# Patient Record
Sex: FEMALE | Race: WHITE | NOT HISPANIC OR LATINO | Employment: OTHER | URBAN - METROPOLITAN AREA
[De-identification: names, ages, dates, MRNs, and addresses within clinical notes are randomized per-mention and may not be internally consistent; named-entity substitution may affect disease eponyms.]

---

## 2023-06-02 NOTE — PROGRESS NOTES
Well Woman Visit    CC: Scheduled annual well gyn visit  Chief Complaint   Patient presents with    WWE     Nexplanon placed about a year ago at [facility] in [place]        Prior Myriad or Hereditary Cancer testing (attach copy of result if yes):  N/A    HPI:   22 y.o.   Social History     Substance and Sexual Activity   Sexual Activity Never    Birth control/protection: Nexplanon    Comment: Nexplanon placed ? pt unsure       Virginal, never used tampons    Irreg menses on nexplanon, occas spotting, 3 days,  changes products q 2-3hrs on heaviest days.   Pain with menses:  None    WWE GC/CT 26y/o optional: Patient is 23 yo or younger and virginal.  GC/CT is not needed and patient DECLINES    Guardian PGM Kim w pt helps w hx taking    Pt has no complaints today.    History: PMHx, Meds, Allergies, PSHx, Social Hx, and POBHx all reviewed and updated.    PHYSICAL EXAM:  /72   Pulse 90   Wt 63.6 kg (140 lb 3.2 oz)   LMP 2023 (Approximate)   Breastfeeding No  Not found.  General- NAD, alert and oriented, appropriate  Psych- Normal mood, good memory  Neck- No masses, no thyroid enlargement  CV- Regular rhythm, no murnurs  Resp- CTA to bases, no wheezes  Abdomen- Soft, non distended, non tender, no masses    Breast left-  Bilaterally symmetrical, no masses, non tender, no nipple discharge, no axillary or supraclavicular nodes palpable  Breast right- Bilaterally symmetrical, no masses, non tender, no nipple discharge, no axillary or supraclavicular nodes palpable    External genitalia- Normal female, no lesions  Urethra/meatus- Normal, no masses, non tender  Bladder- Normal, no masses, non tender  Vagina- Normal, no atrophy, no lesions, no discharge.  Prolapse : none noted, not examined with split speculum to delineate  Cvx- Normal, no lesions, no discharge, No cervical motion tenderness  Uterus- Normal size, shape & consistency.  Non tender, mobile, & no prolapse  Adnexa- No mass, non  tender  Anus/Rectum/Perineum- Not performed    Lymphatic- No palpable neck, axillary, or groin nodes  Ext- No edema, no cyanosis    Skin- No lesions, rash cw pityriasis on upper chest, no acanthosis nigricans  NEXPLANON- palpable in appropriate location, right arm    ASSESSMENT and PLAN:    Diagnoses and all orders for this visit:    1. Well woman exam with routine gynecological exam (Primary)  -     IGP,rfx Aptima HPV All Pth    2. Birth control counseling  Comments:  Records requested yuliya Colby for date of Nexplanon placement, unable to locate care everywhere [facility]    3. Skin rash  Comments:  cw pityriasis, rec FU PCP/Derm if persists        Preventative:  BREAST HEALTH- Monthly self breast exam importance and how to reviewed. MMG and/or MRI (prn) reviewed per society guidelines and her individual history. Screen: Not medically needed  CERVICAL CANCER Screening- Reviewed current ASCCP guidelines for screening w and wo cotest HPV, age specific.  Screen: Updated today  SEXUAL HEALTH: virginal, NA  Importance of WEIGHT MANAGEMENT, nutrition, and exercise reviewed  VACCINATIONS Recommended: COVID and booster PRN, Flu annually, Gardisil/HPV vaccine (up to 44yo), Tdap [id number].  Importance discussed, risk being unvaccinated reviewed.  Questions answered  Smoking status- NON SMOKER  Follow up PCP/Specialist PMHx and Labs        She understands the importance of having any ordered tests to be performed in a timely fashion.  The risks of not performing them include, but are not limited to, advanced cancer stages, bone loss from osteoporosis and/or subsequent increase in morbidity and/or mortality.  She is encouraged to review her results online and/or contact or office if she has questions.     Follow Up:  Return in about 1 year (around 6/5/2024) for WWE.            Shirley Peña,   06/05/2023    [facility] [place] [facility]  [address] [place] [address]  Dept: [phone]  Dept  Fax: [phone]  Loc: [phone]  Loc Fax: [phone]

## 2023-06-05 ENCOUNTER — OFFICE VISIT (OUTPATIENT)
Dept: OBSTETRICS AND GYNECOLOGY | Facility: CLINIC | Age: 23
End: 2023-06-05
Payer: COMMERCIAL

## 2023-06-05 VITALS — WEIGHT: 140.2 LBS | SYSTOLIC BLOOD PRESSURE: 110 MMHG | DIASTOLIC BLOOD PRESSURE: 72 MMHG | HEART RATE: 90 BPM

## 2023-06-05 DIAGNOSIS — R21 SKIN RASH: ICD-10-CM

## 2023-06-05 DIAGNOSIS — Z30.09 BIRTH CONTROL COUNSELING: ICD-10-CM

## 2023-06-05 DIAGNOSIS — Z01.419 WELL WOMAN EXAM WITH ROUTINE GYNECOLOGICAL EXAM: Primary | ICD-10-CM

## 2023-06-05 PROCEDURE — G0123 SCREEN CERV/VAG THIN LAYER: HCPCS | Performed by: OBSTETRICS & GYNECOLOGY

## 2023-06-05 PROCEDURE — 2014F MENTAL STATUS ASSESS: CPT | Performed by: OBSTETRICS & GYNECOLOGY

## 2023-06-05 PROCEDURE — 1160F RVW MEDS BY RX/DR IN RCRD: CPT | Performed by: OBSTETRICS & GYNECOLOGY

## 2023-06-05 PROCEDURE — 1159F MED LIST DOCD IN RCRD: CPT | Performed by: OBSTETRICS & GYNECOLOGY

## 2023-06-05 PROCEDURE — 99385 PREV VISIT NEW AGE 18-39: CPT | Performed by: OBSTETRICS & GYNECOLOGY

## 2023-06-05 RX ORDER — ETONOGESTREL 68 MG/1
1 IMPLANT SUBCUTANEOUS ONCE
COMMUNITY

## 2023-06-13 LAB
CONV .: NORMAL
CYTOLOGIST CVX/VAG CYTO: NORMAL
CYTOLOGY CVX/VAG DOC CYTO: NORMAL
CYTOLOGY CVX/VAG DOC THIN PREP: NORMAL
DX ICD CODE: NORMAL
HIV 1 & 2 AB SER-IMP: NORMAL
Lab: NORMAL
OTHER STN SPEC: NORMAL
STAT OF ADQ CVX/VAG CYTO-IMP: NORMAL

## 2024-07-29 ENCOUNTER — TELEPHONE (OUTPATIENT)
Dept: OBSTETRICS AND GYNECOLOGY | Facility: CLINIC | Age: 24
End: 2024-07-29
Payer: COMMERCIAL

## 2024-07-29 NOTE — TELEPHONE ENCOUNTER
Mailed patient the 08/13/2024 appointment reminder - unable to reach the patient or her grandma to give the appt d/t - no answer and no voicemail.

## 2024-08-07 NOTE — PROGRESS NOTES
Well Woman Visit    CC: Scheduled annual well gyn visit  Chief Complaint   Patient presents with    Gynecologic Exam       HPI:   23 y.o.   Social History     Substance and Sexual Activity   Sexual Activity Never    Birth control/protection: Nexplanon    Comment: Nexplanon 2022 Ac     Office Visit with Shirley Peña, DO (2023)  IGP,rfx Aptima HPV All Pth (2023 15:10) Pap only negative    Autistic, PGM helps w history taking today  Virginal, has nexplanon for bleeding control, likes it    Menses:   q 1-4mo, lasts 7 days, light  Pain with menses:  None    Patient is 25 yo or younger and virginal.  GC/CT is not needed and patient DECLINES    Pt has no complaints today.    History: PMHx, Meds, Allergies, PSHx, Social Hx, and POBHx all reviewed and updated.    PHYSICAL EXAM:  /74   Pulse 90   Wt 62.6 kg (138 lb)   LMP 2024 (Exact Date)  Not found.   General- NAD, alert and oriented, appropriate  Psych- Normal mood, good memory  Neck- No masses, no thyroid enlargement  CV- Regular rhythm, no murmurs  Resp- CTA to bases, no wheezes  Abdomen- Soft, non distended, non tender, no masses    Chaperone present during breast and/or pelvic exam if performed.  Breast left-  Bilaterally symmetrical, no masses, non tender, no nipple discharge, no axillary or supraclavicular nodes palpable.    Breast right- Bilaterally symmetrical, no masses, non tender, no nipple discharge, no axillary or supraclavicular nodes palpable.      External genitalia- Normal female, no lesions  Urethra/meatus- Normal, no masses, non tender  Bladder- Normal, no masses, non tender  Vagina- Normal, no atrophy, no lesions, no discharge.    Prolapse : none noted, not examined with split speculum to delineate  Cvx-  palpable wnl  Uterus- Normal size, shape & consistency.  Non tender, mobile.  Adnexa- No mass, non tender  Anus/Rectum/Perineum- Not performed    Lymphatic- No palpable neck, axillary, or groin nodes  Ext- No  edema, no cyanosis    Skin- No lesions, rash on chest consistent with pityriasis versus other, no acanthosis nigricans  NEXPLANON- palpable in appropriate location, right arm    ASSESSMENT and PLAN:    Diagnoses and all orders for this visit:    1. Well woman exam (Primary)  Comments:  Rec pt/GM to check on placement date for nexplanon    2. Rash  Comments:  chest, declines Derm referral, rec FU PCP.  Rash was present last year, and her grandmother states she has always had it.        Preventative:  BREAST HEALTH- Monthly self breast exam importance and how to reviewed. MMG and/or MRI (prn) reviewed per society guidelines and her individual history. Screen: Not medically needed  CERVICAL CANCER Screening- Reviewed current ASCCP guidelines for screening w and wo cotest HPV, age specific.  Screen: Already up to date  COLON CANCER Screening- Reviewed current medical society guidelines and options.  Screen:  Not medically needed  SEXUAL HEALTH:  Safe sex and condoms  Importance of WEIGHT MANAGEMENT, nutrition, and exercise reviewed.  Ideal BMI discussed  BONE HEALTH- Reviewed current medical society guidelines and options for testing, calcium and vit D intake.  Weight bearing exercise.  DEXA: Not medically needed  VACCINATIONS Recommended: Covid vaccine, Flu annually, Gardisil/HPV vaccine 9-27yo, up to 46yo.  Importance discussed, risk being unvaccinated reviewed.  Questions answered  Smoking status- NON SMOKER/VAPER          She understands the importance of having any ordered tests to be performed in a timely fashion.  The risks of not performing them include, but are not limited to, advanced cancer stages, bone loss from osteoporosis and/or subsequent increase in morbidity and/or mortality.  She is encouraged to review her results online and/or contact or office if she has questions.     Follow Up:  Return in about 1 year (around 8/13/2025) for WWE AND Jan 2025 for nexplanon removal and replacement.            Shirley  DO Mariana  08/13/2024    [facility] [place] [facility]  [address] [place] [address]  Dept: [phone]  Dept Fax: [phone]  Loc: [phone]  Loc Fax: [phone]

## 2024-08-13 ENCOUNTER — OFFICE VISIT (OUTPATIENT)
Dept: OBSTETRICS AND GYNECOLOGY | Facility: CLINIC | Age: 24
End: 2024-08-13
Payer: COMMERCIAL

## 2024-08-13 VITALS — DIASTOLIC BLOOD PRESSURE: 74 MMHG | WEIGHT: 138 LBS | SYSTOLIC BLOOD PRESSURE: 120 MMHG | HEART RATE: 90 BPM

## 2024-08-13 DIAGNOSIS — R21 RASH: ICD-10-CM

## 2024-08-13 DIAGNOSIS — Z01.419 WELL WOMAN EXAM: Primary | ICD-10-CM

## 2024-08-13 PROCEDURE — 99459 PELVIC EXAMINATION: CPT | Performed by: OBSTETRICS & GYNECOLOGY

## 2024-08-13 PROCEDURE — 2014F MENTAL STATUS ASSESS: CPT | Performed by: OBSTETRICS & GYNECOLOGY

## 2024-08-13 PROCEDURE — 1159F MED LIST DOCD IN RCRD: CPT | Performed by: OBSTETRICS & GYNECOLOGY

## 2024-08-13 PROCEDURE — 99395 PREV VISIT EST AGE 18-39: CPT | Performed by: OBSTETRICS & GYNECOLOGY

## 2024-08-13 PROCEDURE — 1160F RVW MEDS BY RX/DR IN RCRD: CPT | Performed by: OBSTETRICS & GYNECOLOGY

## 2025-05-13 ENCOUNTER — CLINICAL SUPPORT (OUTPATIENT)
Dept: OBSTETRICS AND GYNECOLOGY | Age: 25
End: 2025-05-13
Payer: COMMERCIAL

## 2025-05-13 DIAGNOSIS — Z32.00 ENCOUNTER FOR PREGNANCY TEST, RESULT UNKNOWN: ICD-10-CM

## 2025-05-13 LAB — HCG INTACT+B SERPL-ACNC: <0.5 MIU/ML

## 2025-05-13 PROCEDURE — 84702 CHORIONIC GONADOTROPIN TEST: CPT | Performed by: OBSTETRICS & GYNECOLOGY

## 2025-05-13 NOTE — PROGRESS NOTES
Nexplanon Removal and RePlacement    CC: Pt presents for nexplanon removal and replacement  Chief Complaint   Patient presents with    Contraception       Sex in last 2 weeks:  No  Pregnancy test:  BHCG neg  HCG, Quantitative, Pregnancy (Hospital Lab Only) (2025 13:13)  Consent signed: yes  Last pap smear: Pap only negative PDF Report (2023 15:10)      The procedure has been explained in detail.  She understands insertion complications occur rarely and include, but are not limited to, failure (pregnancy), pain, scarring, bleeding, and infection.  It is common (>30%) to have irregular and unpredictable periods. Nexplanon will need to be removed in 3 years and that complications during removal can occur (<2%). Reports of migration to blood vessels of the chest have occurred.  Check it regularly and contact office if it cannot be felt or feels different. Her questions have been answered.      Subjective:  24 y.o. No LMP recorded. Patient has had an implant.    No issues w nexplanon.  Has , wants new one.  Has occas spotting and VB, not bad.     Objective:  /74   Pulse 87     Nexplanon removal was explained in detail.  She understands removal complications occur rarely and include, but are not limited to, pain, scarring, bleeding, damage to nerves or blood vessels, and infection.  All her questions have been answered.     The patient was placed in supine position with the Right arm flexed at the elbow and externally rotated.  The Nexplanon was easily palpable.  The site was cleaned with betadine.  Sterile technique was maintained.  The area was anesthetized with local anesthetic under the vinicio.  A ~2 mm incision was made longitudinally at the tip of the implant closest to the elbow.  The vinicio was grasped with forceps and gently removed.  The vinicio was 4cm long and intact.  A steri-strip and sterile pressure bandage was placed.    Nexplanon insertion  The patient was placed in supine position  with the (non-dominant) Right arm flexed at the elbow and externally rotated.  The insertion site was identified approximately 8-10cm proximal to the medial epicondyle of the humerus and 3-5cm inferior to the sulcus (between the biceps and triceps muscles).  This site is slightly inferior to prior placement site.  The site was cleaned betadine (Hibiclens used if allergy) and alcohol.  Sterile technique was maintained.  The area was anesthetized with local anesthetic.  Visual confirmation was made of the Nexplanon vinicio in the needle tip.  The Nexplanon needle was inserted gently in the subdermal connective tissue to its full length.  Placement was confirmed by visual absence of the vinicio within the needle.  Presence of the Nexplanon was verified by palpation of a 4cm long vinicio by the provider and the patient.  A steri-strip and sterile pressure bandage was placed.    Patient tolerated the procedure well.    Assessment and Plan:  Diagnoses and all orders for this visit:    1. Nexplanon removal (Primary)    2. Nexplanon insertion  -     Etonogestrel (NEXPLANON) 68 MG subdermal implant        Counseling:  Pt was counseled on the risks, benefits, and side-effects of NEXPLANON and provided educational material if desired along with the user card. It provides contraception for 3 years and does require removal. Failure is <1%. It does not protect against STIs, to include HIV/AIDS, and condoms are recommended.   Recommend she track her menstrual cycle.  If she is bleeding more frequently than every 21 days, it is lasting longer than 7 or 8 days or is heavy enough that it is affecting her ADLs, she is to return to the office.    I recommend backup non-hormonal birth-control (condoms and spermacide) for 7 days.       PRECAUTIONS--Remove the bandage in 24 hours and the steri-strips in 3-5 days.  If prolonged or heavy bleeding, check a pregnancy test and call our office.   Removal can sometimes be difficult and/or require surgery.  If it can not be felt, pt is to call our office and use condoms until confirmation of the placement is complete.     Follow Up:  Return in about 1 year (around 5/14/2026) for WWE sooner PRN issues w menses.        Shirley Peña, DO  05/14/2025    [facility] [place] [facility] [place] [id number]  [facility]  [address] [place] [address] [place] [address]  Dept: [phone]  Dept Fax: [phone]  Loc: [phone]  Loc Fax: [phone]

## 2025-05-13 NOTE — PROGRESS NOTES
Venipuncture Blood Specimen Collection  Venipuncture performed in right arm by Esperanza Marques with good hemostasis. Patient tolerated the procedure well without complications.   05/13/25   Esperanza Marques

## 2025-05-14 ENCOUNTER — PROCEDURE VISIT (OUTPATIENT)
Dept: OBSTETRICS AND GYNECOLOGY | Age: 25
End: 2025-05-14
Payer: COMMERCIAL

## 2025-05-14 VITALS — SYSTOLIC BLOOD PRESSURE: 125 MMHG | DIASTOLIC BLOOD PRESSURE: 74 MMHG | HEART RATE: 87 BPM

## 2025-05-14 DIAGNOSIS — Z30.017 NEXPLANON INSERTION: ICD-10-CM

## 2025-05-14 DIAGNOSIS — Z30.46 NEXPLANON REMOVAL: Primary | ICD-10-CM
